# Patient Record
Sex: MALE | Race: ASIAN | NOT HISPANIC OR LATINO
[De-identification: names, ages, dates, MRNs, and addresses within clinical notes are randomized per-mention and may not be internally consistent; named-entity substitution may affect disease eponyms.]

---

## 2017-03-20 PROBLEM — Z00.00 ENCOUNTER FOR PREVENTIVE HEALTH EXAMINATION: Status: ACTIVE | Noted: 2017-03-20

## 2017-05-01 PROBLEM — I10 HTN (HYPERTENSION), BENIGN: Status: RESOLVED | Noted: 2017-05-01 | Resolved: 2017-05-01

## 2017-05-01 PROBLEM — Z86.79 HISTORY OF ATRIAL FIBRILLATION: Status: RESOLVED | Noted: 2017-05-01 | Resolved: 2017-05-01

## 2017-05-02 ENCOUNTER — APPOINTMENT (OUTPATIENT)
Dept: THORACIC SURGERY | Facility: CLINIC | Age: 72
End: 2017-05-02

## 2017-05-02 VITALS
OXYGEN SATURATION: 94 % | WEIGHT: 170 LBS | BODY MASS INDEX: 28.32 KG/M2 | RESPIRATION RATE: 17 BRPM | HEIGHT: 65 IN | HEART RATE: 79 BPM | SYSTOLIC BLOOD PRESSURE: 132 MMHG | DIASTOLIC BLOOD PRESSURE: 78 MMHG | TEMPERATURE: 98.2 F

## 2017-05-02 DIAGNOSIS — Z80.9 FAMILY HISTORY OF MALIGNANT NEOPLASM, UNSPECIFIED: ICD-10-CM

## 2017-05-02 DIAGNOSIS — I10 ESSENTIAL (PRIMARY) HYPERTENSION: ICD-10-CM

## 2017-05-02 DIAGNOSIS — Z86.79 PERSONAL HISTORY OF OTHER DISEASES OF THE CIRCULATORY SYSTEM: ICD-10-CM

## 2017-05-02 DIAGNOSIS — Z87.891 PERSONAL HISTORY OF NICOTINE DEPENDENCE: ICD-10-CM

## 2017-05-02 RX ORDER — RIVAROXABAN 20 MG/1
20 TABLET, FILM COATED ORAL
Refills: 0 | Status: ACTIVE | COMMUNITY

## 2017-05-02 RX ORDER — WARFARIN 2 MG/1
2 TABLET ORAL
Refills: 0 | Status: DISCONTINUED | COMMUNITY
End: 2017-05-02

## 2018-05-01 ENCOUNTER — APPOINTMENT (OUTPATIENT)
Dept: THORACIC SURGERY | Facility: CLINIC | Age: 73
End: 2018-05-01
Payer: MEDICARE

## 2018-05-01 VITALS
RESPIRATION RATE: 17 BRPM | BODY MASS INDEX: 27.29 KG/M2 | WEIGHT: 164 LBS | TEMPERATURE: 97.5 F | HEART RATE: 96 BPM | DIASTOLIC BLOOD PRESSURE: 77 MMHG | OXYGEN SATURATION: 95 % | SYSTOLIC BLOOD PRESSURE: 106 MMHG

## 2018-05-01 DIAGNOSIS — K31.A0 GASTRIC INTESTINAL METAPLASIA, UNSPECIFIED: ICD-10-CM

## 2018-05-01 PROCEDURE — 99213 OFFICE O/P EST LOW 20 MIN: CPT

## 2018-05-01 RX ORDER — LOSARTAN POTASSIUM 25 MG/1
25 TABLET, FILM COATED ORAL
Refills: 0 | Status: COMPLETED | COMMUNITY
End: 2018-05-01

## 2018-05-01 RX ORDER — ERYTHROMYCIN 500 MG/1
TABLET, FILM COATED ORAL
Refills: 0 | Status: COMPLETED | COMMUNITY
End: 2018-05-01

## 2018-05-01 RX ORDER — DEXLANSOPRAZOLE 60 MG/1
60 CAPSULE, DELAYED RELEASE ORAL
Refills: 0 | Status: COMPLETED | COMMUNITY
End: 2018-05-01

## 2018-05-01 RX ORDER — CARVEDILOL 3.12 MG/1
3.12 TABLET, FILM COATED ORAL
Refills: 0 | Status: COMPLETED | COMMUNITY
End: 2018-05-01

## 2019-04-30 ENCOUNTER — APPOINTMENT (OUTPATIENT)
Dept: THORACIC SURGERY | Facility: CLINIC | Age: 74
End: 2019-04-30
Payer: MEDICARE

## 2019-04-30 VITALS
WEIGHT: 165 LBS | TEMPERATURE: 98.2 F | RESPIRATION RATE: 17 BRPM | DIASTOLIC BLOOD PRESSURE: 85 MMHG | HEART RATE: 88 BPM | BODY MASS INDEX: 27.46 KG/M2 | SYSTOLIC BLOOD PRESSURE: 130 MMHG | OXYGEN SATURATION: 96 %

## 2019-04-30 PROCEDURE — 99213 OFFICE O/P EST LOW 20 MIN: CPT

## 2019-04-30 RX ORDER — TAMSULOSIN HYDROCHLORIDE 0.4 MG/1
0.4 CAPSULE ORAL
Refills: 0 | Status: DISCONTINUED | COMMUNITY
End: 2019-04-30

## 2019-04-30 RX ORDER — OMEPRAZOLE 20 MG/1
20 CAPSULE, DELAYED RELEASE ORAL
Refills: 0 | Status: DISCONTINUED | COMMUNITY
End: 2019-04-30

## 2019-04-30 RX ORDER — DEXLANSOPRAZOLE 60 MG/1
60 CAPSULE, DELAYED RELEASE ORAL DAILY
Refills: 0 | Status: ACTIVE | COMMUNITY

## 2019-04-30 RX ORDER — FINASTERIDE 5 MG/1
5 TABLET, FILM COATED ORAL
Refills: 0 | Status: DISCONTINUED | COMMUNITY
End: 2019-04-30

## 2019-05-02 NOTE — DATA REVIEWED
[FreeTextEntry1] : CT chest on 4/22/19: \par -new 7mm RLL ( 4, image 179)\par -stable faint 4mm GGN in BOUCHRA ( 4, image 31).\par - moderate to severe cardiomegaly and ectatic ascending thoracic aorta.

## 2019-05-02 NOTE — CONSULT LETTER
[Dear  ___] : Dear  [unfilled], [Courtesy Letter:] : I had the pleasure of seeing your patient, [unfilled], in my office today. [( Thank you for referring [unfilled] for consultation for _____ )] : Thank you for referring [unfilled] for consultation for [unfilled] [Please see my note below.] : Please see my note below. [Consult Closing:] : Thank you very much for allowing me to participate in the care of this patient.  If you have any questions, please do not hesitate to contact me. [Sincerely,] : Sincerely, [DrRaoul  ___] : Dr. GARDUNO [David Gonzalez MD, MPH] : David Gonzalez MD, MPH [System Director of Thoracic Surgery] : System Director of Thoracic Surgery [Director of [facility] [place]] : Director of [facility] [place] [Professor Cardiovascular & Thoracic Surgery] : Professor Cardiovascular & Thoracic Surgery  [[facility]] : [facility] [FreeTextEntry2] : Irvin Diego MD (GI)\par Margaret Izaguirre MD (PCP)

## 2019-05-02 NOTE — HISTORY OF PRESENT ILLNESS
[FreeTextEntry1] : 74 y/o M, former smoker, w/ hx of HTN, A-Fib on Xarelto and Esophageal Squamous cell CA.\par Now 6yr s/p EGD, Rt VATS, Lap Esophagogastectomy, J-tube placement on 5/3/13. Path revealed Squamous cell CA, 3.5cm, margin and LNs negative, pT2N0 Stg IIA.\par Also s/p EGD dilation on 10/23/14.\par \par CT Chest on 4/26/16 showed post-op changes; VALARIE; a stable 5mm BOUCHRA ggo.\par \par CT Chest on 4/24/18:\par - post-op changes\par - a stable 5mm BOUCHRA ggo (series 3 image 28)\par - VALARIE\par \par EGD on 2/26/18 by Dr. Irvin Diego, gastric antrum bx path revealed extensive intestinal metaplasia.\par \par CT chest on 4/22/19: \par -new 7mm RLL ( 4, image 179)\par -stable faint 4mm GGN in BOUCHRA ( 4, image 31).\par - moderate to severe cardiomegaly and ectatic ascending thoracic aorta.\par \par He presents today for a followup visit. The patient denies SOB, dysphagia, chest pain, hemoptysis, palpitation, fever, n/v, hospitalization and significant weight loss.\par \par

## 2019-05-02 NOTE — ASSESSMENT
[FreeTextEntry1] : 74 y/o M, former smoker, w/ hx of HTN, A-Fib on Xarelto and Esophageal Squamous cell CA.\par Now 6yr s/p EGD, Rt VATS, Lap Esophagogastectomy, J-tube placement on 5/3/13. Path revealed Squamous cell CA, 3.5cm, margin and LNs negative, pT2N0 Stg IIA.\par Also s/p EGD dilation on 10/23/14.\par \par CT Chest on 4/26/16 showed post-op changes; VALARIE; a stable 5mm BOUCHRA ggo.\par \par CT Chest on 4/24/18:\par - post-op changes\par - a stable 5mm BOUCHRA ggo (series 3 image 28)\par - VALARIE\par \par EGD on 2/26/18 by Dr. Irvin Diego, gastric antrum bx path revealed extensive intestinal metaplasia.\par \par CT chest on 4/22/19: \par -new 7mm RLL ( 4, image 179)\par -stable faint 4mm GGN in BOUCHRA ( 4, image 31).\par - moderate to severe cardiomegaly and ectatic ascending thoracic aorta.\par \par I have reviewed the patient's medical records and diagnostic images at the time of this office consultation and have made the following recommendation.\par Plan:\par 1. RTC in 3 months with CT chest w/o contrast. \par \par \par \par \par \par Written by Roya Morton NP, acting as a scribe for Dr. David Gonzalez. \par “The documentation recorded by the scribe accurately reflects the service I personally performed and the decisions made by me.” David Gonzalez MD.\par

## 2019-05-02 NOTE — PHYSICAL EXAM
[Sclera] : the sclera and conjunctiva were normal [PERRL With Normal Accommodation] : pupils were equal in size, round, and reactive to light [Neck Appearance] : the appearance of the neck was normal [Respiration, Rhythm And Depth] : normal respiratory rhythm and effort [Auscultation Breath Sounds / Voice Sounds] : lungs were clear to auscultation bilaterally [Heart Rate And Rhythm] : heart rate was normal and rhythm regular [Heart Sounds] : normal S1 and S2 [Examination Of The Chest] : the chest was normal in appearance [2+] : left 2+ [No Abnormalities] : the abdominal aorta was not enlarged and no bruit was heard [No Pulse Delay] : no pulse delay [Bowel Sounds] : normal bowel sounds [Abdomen Soft] : soft [Abdomen Tenderness] : non-tender [Cervical Lymph Nodes Enlarged Posterior Bilaterally] : posterior cervical [Cervical Lymph Nodes Enlarged Anterior Bilaterally] : anterior cervical [No CVA Tenderness] : no ~M costovertebral angle tenderness [Abnormal Walk] : normal gait [Involuntary Movements] : no involuntary movements were seen [Skin Color & Pigmentation] : normal skin color and pigmentation [Skin Turgor] : normal skin turgor [] : no rash [No Focal Deficits] : no focal deficits [Oriented To Time, Place, And Person] : oriented to person, place, and time [Affect] : the affect was normal [Mood] : the mood was normal [Fingers] :  capillary refill of the fingers was normal [Right Fingers] :  capillary refill of the right fingers was normal [Varicose Veins Of The Right Leg] : the patient has no varicose veins of the right leg [Left Fingers] :  capillary refill of the left fingers was normal [Varicose Veins Of The Left Leg] : the patient has no varicose veins of the left leg [FreeTextEntry1] : deferred

## 2019-08-01 ENCOUNTER — APPOINTMENT (OUTPATIENT)
Dept: THORACIC SURGERY | Facility: CLINIC | Age: 74
End: 2019-08-01
Payer: MEDICARE

## 2019-08-01 VITALS
WEIGHT: 164 LBS | BODY MASS INDEX: 27.29 KG/M2 | TEMPERATURE: 97.6 F | SYSTOLIC BLOOD PRESSURE: 125 MMHG | DIASTOLIC BLOOD PRESSURE: 78 MMHG | HEART RATE: 90 BPM | OXYGEN SATURATION: 95 % | RESPIRATION RATE: 17 BRPM

## 2019-08-01 PROCEDURE — 99213 OFFICE O/P EST LOW 20 MIN: CPT

## 2019-08-02 NOTE — PHYSICAL EXAM
[Auscultation Breath Sounds / Voice Sounds] : lungs were clear to auscultation bilaterally [Heart Rate And Rhythm] : heart rate was normal and rhythm regular [Heart Sounds] : normal S1 and S2 [Heart Sounds Gallop] : no gallops [Murmurs] : no murmurs [Heart Sounds Pericardial Friction Rub] : no pericardial rub [Examination Of The Chest] : the chest was normal in appearance [Chest Visual Inspection Thoracic Asymmetry] : no chest asymmetry [Diminished Respiratory Excursion] : normal chest expansion [Bowel Sounds] : normal bowel sounds [Abdomen Soft] : soft [Abdomen Tenderness] : non-tender [Abdomen Mass (___ Cm)] : no abdominal mass palpated [No CVA Tenderness] : no ~M costovertebral angle tenderness [No Spinal Tenderness] : no spinal tenderness [Abnormal Walk] : normal gait [Nail Clubbing] : no clubbing  or cyanosis of the fingernails [Musculoskeletal - Swelling] : no joint swelling seen [Motor Tone] : muscle strength and tone were normal [Skin Turgor] : normal skin turgor [Skin Color & Pigmentation] : normal skin color and pigmentation [] : no rash [Deep Tendon Reflexes (DTR)] : deep tendon reflexes were 2+ and symmetric [Sensation] : the sensory exam was normal to light touch and pinprick [No Focal Deficits] : no focal deficits [Oriented To Time, Place, And Person] : oriented to person, place, and time [Impaired Insight] : insight and judgment were intact [Affect] : the affect was normal

## 2019-08-05 NOTE — ASSESSMENT
[FreeTextEntry1] : 72 y/o M, former smoker, w/ hx of HTN, A-Fib on Xarelto and Esophageal Squamous cell CA.\par Now 6yr 3mo s/p EGD, Rt VATS, Lap Esophagogastectomy, J-tube placement on 5/3/13. 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  CT reviewed with pt, previous new nodule resolved. RTC in 1 yr with CT Chest w/o contrast. \par \par \par Written by Qian Andrade NP, acting as a scribe for Dr. David Carrillo. \par \par The documentation recorded by the scribe accurately reflects the service I personally performed and the decisions made by me. DAVID CARRILLO MD\par

## 2019-08-05 NOTE — CONSULT LETTER
[Dear  ___] : Dear  [unfilled], [Consult Letter:] : I had the pleasure of evaluating your patient, [unfilled]. [( Thank you for referring [unfilled] for consultation for _____ )] : Thank you for referring [unfilled] for consultation for [unfilled] [Please see my note below.] : Please see my note below. [Sincerely,] : Sincerely, [Consult Closing:] : Thank you very much for allowing me to participate in the care of this patient.  If you have any questions, please do not hesitate to contact me. [DrRaoul  ___] : Dr. GARDUNO [FreeTextEntry2] : Irvin Diego MD (GI)\par Margaret Izaguirre MD (PCP)  [FreeTextEntry3] : David Gonzalez MD, MPH \par System Director of Thoracic Surgery \par Director of Comprehensive Lung and Foregut [place] \par Professor Cardiovascular & Thoracic Surgery  \par [facility]\par

## 2019-08-05 NOTE — HISTORY OF PRESENT ILLNESS
[FreeTextEntry1] : 72 y/o M, former smoker, w/ hx of HTN, A-Fib on Xarelto and Esophageal Squamous cell CA.\par Now 8yr 3mo s/p EGD, Rt VATS, Lap Esophagogastectomy, J-tube placement on 5/3/13. Path revealed Squamous cell CA, 3.5cm, margin and LNs negative, pT2N0 Stg IIA.\par Also s/p EGD dilation on 10/23/14.\par \par CT Chest on 4/26/16 showed post-op changes; VALARIE; a stable 5mm BOUCHRA ggo.\par \par CT Chest on 4/24/18:\par - post-op changes\par - a stable 5mm BOUCHRA ggo (series 3 image 28)\par - VALARIE\par \par EGD on 2/26/18 by Dr. Irvin Diego, gastric antrum bx path revealed extensive intestinal metaplasia.\par \par CT chest on 4/22/19: \par - new 7mm RLL ( 4, image 179)\par - stable faint 4mm GGN in BOUCHAR ( 4, image 31).\par - moderate to severe cardiomegaly and ectatic ascending thoracic aorta.\par \par CT Chest on 7/24/19:\par - resolution of previously identified 7mm RLL nodule\par - stable 4mm BOUCHRA ggo (series 4 image 38)\par - stable 4.7cm ascending aorta aneurysm \par \par Patient is here today for a follow up. Pt reports doing well, denies SOB, cough or CP. Pt reported had recent URI and had resolved.

## 2019-08-05 NOTE — DATA REVIEWED
[FreeTextEntry1] : CT Chest on 7/24/19:\par - resolution of previously identified 7mm RLL nodule\par - stable 4mm BOUCHRA ggo (series 4 image 38)\par - stable 4.7cm ascending aorta aneurysm

## 2020-08-05 ENCOUNTER — APPOINTMENT (OUTPATIENT)
Dept: THORACIC SURGERY | Facility: CLINIC | Age: 75
End: 2020-08-05
Payer: MEDICARE

## 2020-08-05 PROCEDURE — 99442: CPT

## 2020-08-05 NOTE — ASSESSMENT
[FreeTextEntry1] : 73 y/o M, former smoker, w/ hx of HTN, A-Fib on Xarelto and Esophageal Squamous cell CA.\par Now 7 yrs 3 mo s/p EGD, Rt VATS, Lap Esophagogastectomy, J-tube placement on 5/3/13. Path revealed Squamous cell CA, 3.5cm, margin and LNs negative, pT2N0 Stg IIA.\par \par CT Chest on 8/3/2020:\par - post-op gastric pull-through, resulting in extrinsic mass effect on the RLL, w/ volume loss\par - no lung nodules\par - stable 4.6cm ascending thoracic aorta \par \par I have reviewed the patient's medical records and diagnostic images at time of this office consultation and have made the following recommendation:\par 1. CT scan showed no evidence of recurrence, recommended patient to return to office in 1yr with CT Chest w/o contrast\par 2. F/U with GI Dr. Morton\par \par \par I have spent 15 minutes on the phone with patient explaining all of the above.\par \par I personally performed the services described in the documentation, reviewed the documentation recorded by the scribe in my presence and it accurately and completely records my words and actions.\par \par I, Shikha Rm NP, am scribing for and the presence of ANA White, the following sections HISTORY OF PRESENT ILLNESS, PAST MEDICAL/FAMILY/SOCIAL HISTORY; REVIEW OF SYSTEMS; VITAL SIGNS; PHYSICAL EXAM; DISPOSITION.\par

## 2020-08-05 NOTE — CONSULT LETTER
[Dear  ___] : Dear  [unfilled], [Consult Letter:] : I had the pleasure of evaluating your patient, [unfilled]. [( Thank you for referring [unfilled] for consultation for _____ )] : Thank you for referring [unfilled] for consultation for [unfilled] [Please see my note below.] : Please see my note below. [Consult Closing:] : Thank you very much for allowing me to participate in the care of this patient.  If you have any questions, please do not hesitate to contact me. [Sincerely,] : Sincerely, [DrRaoul  ___] : Dr. GARDUNO [FreeTextEntry3] : David Gonzalez MD, MPH \par System Director of Thoracic Surgery \par Director of Comprehensive Lung and Foregut [place] \par Professor Cardiovascular & Thoracic Surgery  \par [facility]\par \par [facility]\par [address]\par Oncology [address]\[place] [address]\par Tel: [phone]\par Fax: [phone]\par  [FreeTextEntry2] : Irvin Diego MD (GI)\par Margaret Izaguirre MD (PCP)

## 2020-08-05 NOTE — DATA REVIEWED
[FreeTextEntry1] : CT Chest on 8/3/2020:\par - post-op gastric pull-through, resulting in extrinsic mass effect on the RLL, w/ volume loss\par - no lung nodules\par - stable 4.6cm ascending thoracic aorta

## 2020-08-05 NOTE — HISTORY OF PRESENT ILLNESS
[Home] : at home, [unfilled] , at the time of the visit. [Medical Office: ([facility])___] : at the medical office located in  [Other:____] : [unfilled] [Verbal consent obtained from patient] : the patient, [unfilled] [FreeTextEntry1] : 75 y/o M, former smoker, w/ hx of HTN, A-Fib on Xarelto and Esophageal Squamous cell CA.\par Now 7 yrs 3 mo s/p EGD, Rt VATS, Lap Esophagogastectomy, J-tube placement on 5/3/13. Path revealed Squamous cell CA, 3.5cm, margin and LNs negative, pT2N0 Stg IIA.\par Also s/p EGD dilation on 10/23/14.\par \par EGD on 2/26/18 by Dr. 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 Doing well, denies dysphagia.

## 2021-10-06 PROBLEM — K31.A0 INTESTINAL METAPLASIA OF GASTRIC MUCOSA: Status: ACTIVE | Noted: 2018-05-02

## 2022-01-26 PROBLEM — C15.9 SQUAMOUS CELL ESOPHAGEAL CANCER: Status: ACTIVE | Noted: 2017-05-01

## 2022-01-27 ENCOUNTER — APPOINTMENT (OUTPATIENT)
Dept: THORACIC SURGERY | Facility: CLINIC | Age: 77
End: 2022-01-27
Payer: MEDICARE

## 2022-01-27 DIAGNOSIS — C15.9 MALIGNANT NEOPLASM OF ESOPHAGUS, UNSPECIFIED: ICD-10-CM

## 2022-01-27 PROCEDURE — 99443: CPT

## 2022-01-28 NOTE — HISTORY OF PRESENT ILLNESS
[Home] : at home, [unfilled] , at the time of the visit. [Medical Office: ([facility])___] : at the medical office located in  [Verbal consent obtained from patient] : the patient, [unfilled] [Other:____] : [unfilled] [FreeTextEntry1] : \par 77 y/o M, former smoker, w/ hx of HTN, A-Fib on Xarelto and Esophageal Squamous cell CA.\par Now 8 yrs 8 mo s/p EGD, Rt VATS, Lap Esophagogastectomy, J-tube placement on 5/3/13. Path revealed Squamous cell CA, 3.5cm, margin and LNs negative, pT2N0 Stg IIA.\par Also s/p EGD dilation on 10/23/14.\par \par EGD on 2/26/18 by Dr. Irvin Diego, gastric antrum bx path revealed extensive intestinal metaplasia.\par \par CT Chest on 8/3/2020:\par - post-op gastric pull-through, resulting in extrinsic mass effect on the RLL, w/ volume loss\par - no lung nodules\par - stable 4.6cm ascending thoracic aorta \par \par Last connected with patient 8/2020, recommended patient to RTC 8/2021 with CT Chest, lost f/u.\par \par CT Chest on 1/24/22:\par - post-op esophagectomy w/ gastric pull-through\par - increasing size 5.1cm ascending thoracic aorta\par - no lung nodules\par \par Patient is followed today via Telephonic visit. \par

## 2022-01-28 NOTE — ASSESSMENT
[FreeTextEntry1] : 75 y/o M, former smoker, w/ hx of HTN, A-Fib on Xarelto and Esophageal Squamous cell CA.\par Now 8 yrs 8 mo s/p EGD, Rt VATS, Lap Esophagogastectomy, J-tube placement on 5/3/13. Path revealed Squamous cell CA, 3.5cm, margin and LNs negative, pT2N0 Stg IIA.\par \par CT Chest on 1/24/22:\par - post-op esophagectomy w/ gastric pull-through\par - increasing size 5.1cm ascending thoracic aorta\par - no lung nodules\par \par I have reviewed the patient's medical records and diagnostic images at time of this office consultation and have made the following recommendation:\par 1. CT scan showed no evidence of recurrence, recommended patient to return to office in 1yr w/ CT Chest w/o contrast.\par 2. However, CT scan showed increasing size ascending thoracic aorta, strongly recommended patient to f/u cardiologist.\par \par \par I personally performed the services described in the documentation, reviewed the documentation recorded by the scribe in my presence and it accurately and completely records my words and actions.\par \par I, Shikha Rm, DIRK, am scribing for and the presence of ANA White, the following sections HISTORY OF PRESENT ILLNESS, PAST MEDICAL/FAMILY/SOCIAL HISTORY; REVIEW OF SYSTEMS; VITAL SIGNS; PHYSICAL EXAM; DISPOSITION.\par \par

## 2022-01-28 NOTE — CONSULT LETTER
[Dear  ___] : Dear  [unfilled], [Courtesy Letter:] : I had the pleasure of seeing your patient, [unfilled], in my office today. [Please see my note below.] : Please see my note below. [Consult Closing:] : Thank you very much for allowing me to participate in the care of this patient.  If you have any questions, please do not hesitate to contact me. [Sincerely,] : Sincerely, [DrRaoul  ___] : Dr. GARDUNO [FreeTextEntry2] : Irvin Diego MD (GI)\par Margaret Izaguirre MD (PCP)  [FreeTextEntry3] : David Gonzalez MD, MPH \par System Director of Thoracic Surgery \par Director of Comprehensive Lung and Foregut [place] \par Professor Cardiovascular & Thoracic Surgery  \par [facility]\par \par [facility]\par [address]\par Oncology [address]\[place] [address]\par Tel: [phone]\par Fax: [phone]\par